# Patient Record
Sex: MALE | URBAN - METROPOLITAN AREA
[De-identification: names, ages, dates, MRNs, and addresses within clinical notes are randomized per-mention and may not be internally consistent; named-entity substitution may affect disease eponyms.]

---

## 2017-02-14 ENCOUNTER — MEDICAL CORRESPONDENCE (OUTPATIENT)
Dept: HEALTH INFORMATION MANAGEMENT | Facility: CLINIC | Age: 82
End: 2017-02-14

## 2017-02-21 ENCOUNTER — OFFICE VISIT (OUTPATIENT)
Dept: AUDIOLOGY | Facility: CLINIC | Age: 82
End: 2017-02-21

## 2017-02-21 DIAGNOSIS — H90.3 SENSORY HEARING LOSS, BILATERAL: Primary | ICD-10-CM

## 2017-02-21 NOTE — PROGRESS NOTES
AUDIOLOGY REPORT    BACKGROUND INFORMATION: Albert Nova was seen was seen in Audiology at the [facility] [place] on 2/21/2017 for follow-up.The patient has been seen previously in this clinic and results revealed moderate to moderately-severe sensorineural hearing loss in the left ear and moderate to severe sensorineural hearing loss in the right ear.  The patient was fit with Phonak Virto Q 50 ITEs on 7/10/15.  The patient reports today he can hear with his hearing aids, but feels his hearing has progressed and that he is not hearing from the hearing aids as well as when he first was fit.     TEST RESULTS AND PROCEDURES: A exam of hearing aid shows that both are in good working condition. Electroacoustic analysis indicates good function. Turned up overall gain by 6 dB and the patient felt he was hearing more clearly. Counseled patient and his daughter on realistic expectations even with the hearing aids. Briefly explained a remote microphone. Patient will try adjustments and see how he is doing. If problem continues, explained that we should repeat the hearing test to se if hearing has changed a significant amount.    SUMMARY AND RECOMMENDATIONS: A hearing aid check was performed today. Adjustments were made as noted above and the patient will return for follow-up as needed. His daughter will think about the accessories.  Call this clinic with questions regarding today s visit.      Julio Garrett, [facility]  Licensed Audiologist  [place] #[id number]

## 2017-02-21 NOTE — MR AVS SNAPSHOT
After Visit Summary   2017    Albert Nova    MRN: [id number]           Patient Information     Date Of Birth          1923        Visit Information        Provider Department      2017 3:00 PM Elizabeth Maria AuD [facility] Audiology        Today's Diagnoses     Sensory hearing loss, bilateral    -  1       Follow-ups after your visit        Who to contact     Please call your clinic at [phone] to:    Ask questions about your health    Make or cancel appointments    Discuss your medicines    Learn about your test results    Speak to your doctor   If you have compliments or concerns about an experience at your clinic, or if you wish to file a complaint, please contact [facility] Patient Relations at [phone] or email us at [email or web address]         Additional Information About Your Visit        MyChart Information     [employer] is an electronic gateway that provides easy, online access to your medical records. With Tensor[employer], you can request a clinic appointment, read your test results, renew a prescription or communicate with your care team.     To sign up for Echo360t visit the website at [email or web address]   You will be asked to enter the access code listed below, as well as some personal information. Please follow the directions to create your username and password.     Your access code is: [email or web address]  Expires: 2017  6:31 AM     Your access code will  in 90 days. If you need help or a new code, please contact your [facility] or call [phone] for assistance.        Care EveryWhere ID     This is your Care EveryWhere ID. This could be used by other organizations to access your [employer] medical records  [id number]         Blood Pressure from Last 3 Encounters:   No data found for BP    Weight from Last 3 Encounters:   No data found for Wt              We Performed the Following      No Charge, Hearing Aid Clinic Visit        Primary Care Provider    None Specified       No primary provider on file.        Thank you!     Thank you for choosing [facility] AUDIOLOGY  for your care. Our goal is always to provide you with excellent care. Hearing back from our patients is one way we can continue to improve our services. Please take a few minutes to complete the written survey that you may receive in the mail after your visit with us. Thank you!             Your Updated Medication List - Protect others around you: Learn how to safely use, store and throw away your medicines at www.disposemymeds.org.      Notice  As of 2/21/2017  4:05 PM    You have not been prescribed any medications.

## 2017-03-17 ENCOUNTER — OFFICE VISIT (OUTPATIENT)
Dept: AUDIOLOGY | Facility: CLINIC | Age: 82
End: 2017-03-17

## 2017-03-17 DIAGNOSIS — H90.3 SENSORY HEARING LOSS, BILATERAL: Primary | ICD-10-CM

## 2017-03-17 NOTE — PROGRESS NOTES
AUDIOLOGY REPORT    BACKGROUND INFORMATION: Albert Nova was seen was seen in Audiology at the [facility] on 3/17/2017 for follow-up.The patient has been seen previously in this clinic and results revealed moderate to moderately-severe sensorineural hearing loss in the left ear and moderate to severe sensorineural hearing loss in the right ear.  The patient was fit with Phonak Virto Q 50 ITEs on 7/10/15.  The patient reports today he was very satisfied with his last adjustments but in the recent few days his hearing has decreased.     TEST RESULTS AND PROCEDURES: Otoscopy revealed wax bilaterally. I removed the wax in both ears. I changed waxguards and reran the feedback management system in order to give him more gain in the high frequencies. Overall gain was increased by 3 and he reported satisfaction.    SUMMARY AND RECOMMENDATIONS: A hearing aid check was performed today. Adjustments were made as noted above and the patient will return for follow-up as needed. I recommend returning for a hearing evaluation as it has been about 2 years. Call this clinic with questions regarding today s visit.      Bri Moise  Licensed Audiologist  [place] License #[id number]

## 2017-03-17 NOTE — MR AVS SNAPSHOT
After Visit Summary   3/17/2017    Albert Nova    MRN: [id number]           Patient Information     Date Of Birth          11/23/1923        Visit Information        Provider Department      3/17/2017 3:00 PM Aletha Noyola AuD [facility] Audiology         Follow-ups after your visit        Your next 10 appointments already scheduled     May 05, 2017  2:30 PM CDT   (Arrive by 2:15 PM)   Hearing Evaluation with Sandhya Kline [facility] Audiology ([facility])    [address] [place] [id number] [phone]           Please see your medical professional for ear cleaning prior to this appointment if you believe wax buildup may be an issue. All patients are required to have a physician's order stating the medical reason for the hearing test. Your doctor can send an electronic order, use their own form or we have provided a form (called Physician's Order for Audiology Services). It states that there is a medical reason for your exam. Without an order you may need to be rescheduled until the order can be obtained.              Who to contact     Please call your clinic at [phone] to:    Ask questions about your health    Make or cancel appointments    Discuss your medicines    Learn about your test results    Speak to your doctor   If you have compliments or concerns about an experience at your clinic, or if you wish to file a complaint, please contact [facility] Phys[facility] Patient Relations at [phone] or email us at [email or web address]         Additional Information About Your Visit        MyChart Information     [employer] is an electronic gateway that provides easy, online access to your medical records. With [employer], you can request a clinic appointment, read your test results, renew a prescription or communicate with your care team.     To sign up for Z[employer]it [employer]ured Solutionst visit the website at [email or web address]    You will be asked to enter the access code listed below, as well as some personal information. Please follow the directions to create your username and password.     Your access code is: [email or web address]  Expires: 2017  7:31 AM     Your access code will  in 90 days. If you need help or a new code, please contact your [facility] Clinic or call [phone] for assistance.        Care EveryWhere ID     This is your Care EveryWhere ID. This could be used by other organizations to access your [place] medical records  [id number]         Blood Pressure from Last 3 Encounters:   No data found for BP    Weight from Last 3 Encounters:   No data found for Wt              Today, you had the following     No orders found for display       Primary Care Provider    None Specified       No primary provider on file.        Thank you!     Thank you for choosing [facility] AUDIOLOGY  for your care. Our goal is always to provide you with excellent care. Hearing back from our patients is one way we can continue to improve our services. Please take a few minutes to complete the written survey that you may receive in the mail after your visit with us. Thank you!             Your Updated Medication List - Protect others around you: Learn how to safely use, store and throw away your medicines at www.disposemymeds.org.      Notice  As of 3/17/2017  3:18 PM    You have not been prescribed any medications.

## 2017-05-05 ENCOUNTER — OFFICE VISIT (OUTPATIENT)
Dept: AUDIOLOGY | Facility: CLINIC | Age: 82
End: 2017-05-05

## 2017-05-05 DIAGNOSIS — H90.3 SENSORY HEARING LOSS, BILATERAL: Primary | ICD-10-CM

## 2017-05-05 NOTE — PROGRESS NOTES
AUDIOLOGY REPORT    SUBJECTIVE:  Albert Nova is a 89 year old male who was seen in the Audiology Clinic at the [facility] for audiologic evaluation.  The patient has been seen previously in this clinic on 5/15/95875 for assessment and results indicated moderate sloping to severe sensorineural hearing loss, right and mild sloping to profound sensorineural hearing loss left. The patient was fit with Phonak Virto Q 50 ITEs on 7/10/15. The patient reports a decrease in his ability to understand speech and is unsure if this is a result of a change in hearing or his age. The patient denies bilateral tinnitus, bilateral otalgia, bilateral drainage, bilateral aural fullness and dizziness. He does report frequent imbalance, but has not fallen in the past two years. They were accompanied today by their daughter and son-in-law.    OBJECTIVE:  Fall Risk Screen:  1. Have you fallen two or more times in the past year? No  2. Have you fallen and had an injury in the past year? No    Otoscopic exam indicates non-occluding cerumen bilaterally    Pure Tone Thresholds assessed using conventional audiometry with good  reliability from 250-8000 Hz bilaterally using insert earphones and circumaural headphones     RIGHT:  Moderate sloping to severe sensorineural hearing loss     LEFT:   Moderate sloping to severe sensorineural hearing loss     Tympanogram:    RIGHT: Hypermobile     LEFT:  Hypermobile    Reflexes (reported by stimulus ear):  RIGHT: Ipsilateral is absent at frequencies tested  RIGHT: Contralateral is elevated at frequencies tested  LEFT:   Ipsilateral is elevated at frequencies tested  LEFT:   Contralateral is absent at frequencies tested      Speech Reception Threshold:    RIGHT: 85 dB HL    LEFT:   80 dB HL  Word Recognition Score:     RIGHT: 8% at 90 dB HL using NU-6 recorded word list.     LEFT:   16% at 90 dB HL using NU-6 recorded word list.   *Attempted with a louder  presentation level, however patient reported discomfort     Electroacoustic check of hearing aids revealed them to to be functioning appropriately and meeting or slightly exceeding NAL-NL1 targets for soft, medium, and loud inputs. Significant counseling of decreased word recognition testing was completed with the patient and his family. Recommend good communication strategies, and also spoke about assistive deices. He is not interested in assistive devices at this time. No charge in warranty hearing aids.    ASSESSMENT:   Compared to patient's previous audiogram dated 5/15/2015, hearing has remained stable however word recognition has significantly decreased bilaterally. Today s results were discussed with the patient in detail.     PLAN:  Patient was counseled regarding his hearing loss and decreased word recognition and it's impact on communication. We discussed that new hearing aids are not recommended as hearing aids cannot overcome the loss in clarity. We reminded the patient and his family that an assistive listening device would be the next stop, however Hang is not interested in this technology at this time. During this discussion, Hang stated that he was grateful and happy to have lived such a long life but that he was ready to pass on. He is frustrated that he has become isolated due to his hearing loss. We encouraged him to advocate for himself and his hearing loss when he is in a difficult listening situation. It is recommended that the patient return as needed or at least every 9-12 months for cleaning and assessment of hearing aid.  Please call this clinic with questions regarding these results or recommendations.    Zara Burrell M.A.  Audiology Extern  Temporary License #[id number]           I was present with the patient for the entire Audiology appointment including all procedures/testing performed by the AuD student, and agree with the student s assessment and plan as documented.      Elizabeth Maria,  [facility]-A  Licensed Audiologist  [place] #[id number]

## 2017-05-05 NOTE — MR AVS SNAPSHOT
After Visit Summary   2017    Albert Nova    MRN: [id number]           Patient Information     Date Of Birth          1923        Visit Information        Provider Department      2017 2:30 PM Elizabeth Maria AuD [facility] Audiology        Today's Diagnoses     Sensory hearing loss, bilateral    -  1       Follow-ups after your visit        Who to contact     Please call your clinic at [phone] to:    Ask questions about your health    Make or cancel appointments    Discuss your medicines    Learn about your test results    Speak to your doctor   If you have compliments or concerns about an experience at your clinic, or if you wish to file a complaint, please contact [facility] Patient Relations at [phone] or email us at [email or web address]         Additional Information About Your Visit        MyChart Information     [employer] is an electronic gateway that provides easy, online access to your medical records. With [employer], you can request a clinic appointment, read your test results, renew a prescription or communicate with your care team.     To sign up for Epomt visit the website at [email or web address]   You will be asked to enter the access code listed below, as well as some personal information. Please follow the directions to create your username and password.     Your access code is: [email or web address]  Expires: 2017  7:31 AM     Your access code will  in 90 days. If you need help or a new code, please contact your [facility] Clinic or call [phone] for assistance.        Care EveryWhere ID     This is your Care EveryWhere ID. This could be used by other organizations to access your [place] medical records  [id number]         Blood Pressure from Last 3 Encounters:   No data found for BP    Weight from Last 3 Encounters:   No data found for Wt              We Performed the Following      Cmprhn Audiometry Thrshld Eval & Speech Recog (04539)     Tymps / Reflex   (36427)        Primary Care Provider    None Specified       No primary provider on file.        Thank you!     Thank you for choosing [facility] AUDIOLOGY  for your care. Our goal is always to provide you with excellent care. Hearing back from our patients is one way we can continue to improve our services. Please take a few minutes to complete the written survey that you may receive in the mail after your visit with us. Thank you!             Your Updated Medication List - Protect others around you: Learn how to safely use, store and throw away your medicines at www.disposemymeds.org.      Notice  As of 5/5/2017  3:50 PM    You have not been prescribed any medications.

## 2018-10-16 ENCOUNTER — TELEPHONE (OUTPATIENT)
Dept: AUDIOLOGY | Facility: CLINIC | Age: 83
End: 2018-10-16

## 2018-10-16 NOTE — TELEPHONE ENCOUNTER
[facility] Call Center    Phone Message    May a detailed message be left on voicemail: no    Reason for Call: Other: Pt's dtr is calling about pt's Rt hearing aide.  She states it is crushed from at the Living facility pt is at.  Pt's dtr has questions about repairs, please give her a call back.     Action Taken: Message routed to:  [facility] ([facility]): Michelle

## 2018-10-16 NOTE — TELEPHONE ENCOUNTER
Called daughter back regarding crushed hearing aid. Encouraged her to bring in the crushed hearing aid to be assessed to see if can be repaired. There is no L and D so if cannot be repaired he will have to start the process to get a hearing aids.           Bri Garrett., [facility]-A  Licensed Audiologist  [place] #[id number]

## 2018-11-05 ENCOUNTER — OFFICE VISIT (OUTPATIENT)
Dept: AUDIOLOGY | Facility: CLINIC | Age: 83
End: 2018-11-05

## 2018-11-05 DIAGNOSIS — H90.3 SENSORY HEARING LOSS, BILATERAL: Primary | ICD-10-CM
